# Patient Record
Sex: FEMALE | Race: BLACK OR AFRICAN AMERICAN | NOT HISPANIC OR LATINO | Employment: STUDENT | URBAN - NONMETROPOLITAN AREA
[De-identification: names, ages, dates, MRNs, and addresses within clinical notes are randomized per-mention and may not be internally consistent; named-entity substitution may affect disease eponyms.]

---
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
## 2025-03-09 ENCOUNTER — HOSPITAL ENCOUNTER (EMERGENCY)
Facility: HOSPITAL | Age: 20
Discharge: HOME | End: 2025-03-09

## 2025-03-09 VITALS
HEART RATE: 89 BPM | BODY MASS INDEX: 38.45 KG/M2 | HEIGHT: 67 IN | WEIGHT: 245 LBS | DIASTOLIC BLOOD PRESSURE: 84 MMHG | OXYGEN SATURATION: 98 % | TEMPERATURE: 98 F | RESPIRATION RATE: 17 BRPM | SYSTOLIC BLOOD PRESSURE: 124 MMHG

## 2025-03-09 DIAGNOSIS — K08.89 PAIN, DENTAL: Primary | ICD-10-CM

## 2025-03-09 PROCEDURE — 99284 EMERGENCY DEPT VISIT MOD MDM: CPT | Mod: 25

## 2025-03-09 PROCEDURE — 96372 THER/PROPH/DIAG INJ SC/IM: CPT | Performed by: NURSE PRACTITIONER

## 2025-03-09 PROCEDURE — 99284 EMERGENCY DEPT VISIT MOD MDM: CPT | Mod: ,,, | Performed by: NURSE PRACTITIONER

## 2025-03-09 PROCEDURE — 63600175 PHARM REV CODE 636 W HCPCS: Mod: JZ,TB | Performed by: NURSE PRACTITIONER

## 2025-03-09 RX ORDER — KETOROLAC TROMETHAMINE 30 MG/ML
30 INJECTION, SOLUTION INTRAMUSCULAR; INTRAVENOUS
Status: COMPLETED | OUTPATIENT
Start: 2025-03-09 | End: 2025-03-09

## 2025-03-09 RX ORDER — AMOXICILLIN AND CLAVULANATE POTASSIUM 875; 125 MG/1; MG/1
1 TABLET, FILM COATED ORAL 2 TIMES DAILY
Qty: 14 TABLET | Refills: 0 | Status: SHIPPED | OUTPATIENT
Start: 2025-03-09

## 2025-03-09 RX ORDER — KETOROLAC TROMETHAMINE 10 MG/1
10 TABLET, FILM COATED ORAL EVERY 6 HOURS PRN
Qty: 20 TABLET | Refills: 0 | Status: SHIPPED | OUTPATIENT
Start: 2025-03-09 | End: 2025-03-14

## 2025-03-09 RX ADMIN — KETOROLAC TROMETHAMINE 30 MG: 30 INJECTION, SOLUTION INTRAMUSCULAR at 07:03

## 2025-03-09 NOTE — ED TRIAGE NOTES
PT ARRIVED TO ER WITH C/O (R) LOWER JAW PAIN. STATES THIS STARTED ON THURSDAY. NO KNOWN TOOTH ISSUES.

## 2025-03-09 NOTE — DISCHARGE INSTRUCTIONS
Use prescriptions as directed. Alternate Tylenol and Toradol as needed for pain. Avoid the use of other NSAIDs such as Ibuprofen, Motrin, Aleve, Naproxen etc. While taking Toradol. Follow up with your dentist as soon as possible. Return to the ED for worsening signs and symptoms or otherwise as needed.

## 2025-03-09 NOTE — ED PROVIDER NOTES
Encounter Date: 3/9/2025       History     Chief Complaint   Patient presents with    Jaw Pain     20 y/o OCTAVIO presents to the emergency department with right lower jaw pain. She states her symptoms started on Thursday and have progressively worsened. She denies fevers or chills. She states she had some swelling on yesterday but it seems to have improved. She states she took some Tylenol with little improvement. She states it has been interfering with her sleeping at night. She knows of no particular exacerbating or remitting factors.     The history is provided by the patient.     Review of patient's allergies indicates:  No Known Allergies  History reviewed. No pertinent past medical history.  History reviewed. No pertinent surgical history.  No family history on file.  Social History[1]  Review of Systems   All other systems reviewed and are negative.      Physical Exam     Initial Vitals [03/09/25 0655]   BP Pulse Resp Temp SpO2   124/84 89 17 98.3 °F (36.8 °C) 98 %      MAP       --         Physical Exam    Constitutional: She appears well-developed and well-nourished. She is cooperative.  Non-toxic appearance.   HENT: Mouth/Throat: Oropharynx is clear and moist and mucous membranes are normal. Dental caries present.   Cardiovascular:  Normal rate, regular rhythm and normal heart sounds.           Pulmonary/Chest: Effort normal and breath sounds normal.     Neurological: She is alert and oriented to person, place, and time. GCS eye subscore is 4. GCS verbal subscore is 5. GCS motor subscore is 6.   Skin: Skin is warm, dry and intact.         Medical Screening Exam   See Full Note    ED Course   Procedures  Labs Reviewed - No data to display       Imaging Results    None          Medications   ketorolac injection 30 mg (30 mg Intramuscular Given 3/9/25 0730)     Medical Decision Making  20 y/o OCTAVIO presents to the emergency department with right lower jaw pain. She states her symptoms started on Thursday and have  progressively worsened. She denies fevers or chills. She states she had some swelling on yesterday but it seems to have improved. She states she took some Tylenol with little improvement. She states it has been interfering with her sleeping at night. She knows of no particular exacerbating or remitting factors.     Problems Addressed:  Pain, dental:     Details: Pt afebrile, non toxic appearing. No obvious swelling that I can appreciate on exam. Does have some cavities of right lower teeth. May be developing abscess. Toradol for pain. Rx Augmentin and Toradol, counseled on use and supportive measures. Follow up instructions given. Warning s/s discussed and return precautions given; the patient has v/u.    Risk  OTC drugs.  Prescription drug management.                                      Clinical Impression:   Final diagnoses:  [K08.89] Pain, dental (Primary)        ED Disposition Condition    Discharge Stable          ED Prescriptions       Medication Sig Dispense Start Date End Date Auth. Provider    amoxicillin-clavulanate 875-125mg (AUGMENTIN) 875-125 mg per tablet Take 1 tablet by mouth 2 (two) times daily. 14 tablet 3/9/2025 -- Kristan Tsai FNP    ketorolac (TORADOL) 10 mg tablet Take 1 tablet (10 mg total) by mouth every 6 (six) hours as needed for Pain. Take with food 20 tablet 3/9/2025 3/14/2025 Kristan Tsai FNP          Follow-up Information       Follow up With Specialties Details Why Contact Info    Dentist  Schedule an appointment as soon as possible for a visit                  [1]   Social History  Tobacco Use    Smoking status: Never    Smokeless tobacco: Never   Substance Use Topics    Alcohol use: Not Currently    Drug use: Never        Kristan Tsai FNP  03/09/25 1222